# Patient Record
Sex: MALE | URBAN - METROPOLITAN AREA
[De-identification: names, ages, dates, MRNs, and addresses within clinical notes are randomized per-mention and may not be internally consistent; named-entity substitution may affect disease eponyms.]

---

## 2021-03-08 ENCOUNTER — OFFICE VISIT (OUTPATIENT)
Dept: URBAN - METROPOLITAN AREA CLINIC 81 | Facility: CLINIC | Age: 21
End: 2021-03-08
Payer: COMMERCIAL

## 2021-03-08 DIAGNOSIS — H43.392 OTHER VITREOUS OPACITIES, LEFT EYE: Primary | Chronic | ICD-10-CM

## 2021-03-08 PROCEDURE — 99203 OFFICE O/P NEW LOW 30 MIN: CPT | Performed by: OPTOMETRIST

## 2021-03-08 ASSESSMENT — KERATOMETRY
OD: 45.13
OS: 44.50

## 2021-03-08 ASSESSMENT — INTRAOCULAR PRESSURE
OD: 13
OS: 13

## 2021-03-08 NOTE — IMPRESSION/PLAN
Impression: Other vitreous opacities, left eye: H43.392. Plan: Discussed signs and symptoms of PVD/floaters. Signs and symptoms of retinal detachment were discussed in detail. There is no evidence of retina pathology. No treatment is required at this time. Patient instructed to call immediately if any signs or symptoms of retinal detachments occur.

## 2022-05-04 ENCOUNTER — OFFICE VISIT (OUTPATIENT)
Dept: URBAN - METROPOLITAN AREA CLINIC 81 | Facility: CLINIC | Age: 22
End: 2022-05-04
Payer: COMMERCIAL

## 2022-05-04 DIAGNOSIS — H43.392 OTHER VITREOUS OPACITIES, LEFT EYE: ICD-10-CM

## 2022-05-04 DIAGNOSIS — H10.89 OTHER CONJUNCTIVITIS: Primary | ICD-10-CM

## 2022-05-04 PROCEDURE — 99213 OFFICE O/P EST LOW 20 MIN: CPT | Performed by: OPTOMETRIST

## 2022-05-04 RX ORDER — LOTEPREDNOL ETABONATE 2 MG/ML
0.2 % SUSPENSION/ DROPS OPHTHALMIC
Qty: 5 | Refills: 1 | Status: ACTIVE
Start: 2022-05-04

## 2022-05-04 ASSESSMENT — KERATOMETRY
OS: 44.75
OD: 45.25

## 2022-05-04 ASSESSMENT — INTRAOCULAR PRESSURE
OD: 14
OS: 15

## 2022-05-04 NOTE — IMPRESSION/PLAN
Impression: Other conjunctivitis: H10.89. OS>OD
-went to urgent care 1 month ago, was given unknown antibiotic drop with no significant improvement Plan: Discussed. Lid scrubs with diluted Jacelyn Hodgkin and Johnson tear free baby shampoo or brand name face wash as directed and monitor PRN. Start Alrex QID x 1 week, BID x 1 week, then D/C sample and coupon dispensed. Call with concerns and ok to substitute Prednisolone Acetate if Alrex not covered.

## 2022-05-04 NOTE — IMPRESSION/PLAN
Impression: Other vitreous opacities, left eye: H43.392.  Plan: Discussed , Stable, Continue to monitor